# Patient Record
Sex: FEMALE | Race: WHITE
[De-identification: names, ages, dates, MRNs, and addresses within clinical notes are randomized per-mention and may not be internally consistent; named-entity substitution may affect disease eponyms.]

---

## 2018-06-22 ENCOUNTER — HOSPITAL ENCOUNTER (OUTPATIENT)
Dept: HOSPITAL 47 - RADMAMWWP | Age: 48
Discharge: HOME | End: 2018-06-22
Payer: COMMERCIAL

## 2018-06-22 DIAGNOSIS — R92.8: ICD-10-CM

## 2018-06-22 DIAGNOSIS — Z12.31: Primary | ICD-10-CM

## 2018-06-22 PROCEDURE — 77067 SCR MAMMO BI INCL CAD: CPT

## 2018-06-22 PROCEDURE — 77066 DX MAMMO INCL CAD BI: CPT

## 2018-06-22 PROCEDURE — 77062 BREAST TOMOSYNTHESIS BI: CPT

## 2018-06-22 NOTE — MM
Reason for exam: additional evaluation requested from abnormal screening.



History:

Family history of breast cancer in paternal grandmother and breast cancer in 

paternal aunt.

Took hormonal contraceptives for 5 years.



Physical Findings:

Breast exam preformed at baseline screening.



MG 3D Work Up W/Cad MAYRA

Bilateral LM view(s) were taken.  Spot compression CC and spot compression MLO 

view(s) were taken of the left breast.

Finding: There is a 6 x 8 mm high density, circumscribed round mass located 5 cm 

from the nipple in the lower quadrant, middle position.



These results were verbally communicated with the patient and result sheet given 

to the patient on 6/22/18.





ASSESSMENT: Incomplete: need additional imaging evaluation, BI-RAD 0



RECOMMENDATION:

Ultrasound of the left breast.

## 2018-06-22 NOTE — MM
Reason for exam: screening  (asymptomatic).

Baseline mammogram.



History:

Family history of breast cancer in paternal grandmother and breast cancer in 

paternal aunt.

Took hormonal contraceptives for 5 years.



Physical Findings:

Nurse Summary: bilateral prominent nodularity (nurse ts).



MG Screening Mammo w CAD

Bilateral CC and MLO view(s) were taken.

The breast tissue is heterogeneously dense. This may lower the sensitivity of 

mammography.

Finding: There is a 8 mm equal density (isodense), indistinct round mass located 5

cm from the nipple in the middle position of the left breast on CC view.



These results were verbally communicated with the patient and result sheet given 

to the patient on 6/22/18.





ASSESSMENT: Incomplete: need additional imaging evaluation, BI-RAD 0



RECOMMENDATION:

Special view mammogram of both breasts.



If lesion persists on supplemental views, image directed ultrasound is 

recommended.



[facility] will attempt to contact patient to return for supplemental 

views and ultrasound if indicated.

## 2018-06-22 NOTE — USB
Reason for exam: additional evaluation requested from abnormal screening.



History:

Family history of breast cancer in paternal grandmother and breast cancer in 

paternal aunt.

Took hormonal contraceptives for 5 years.



US Breast Workup Limited LT

Left limited breast ultrasound including focal area of concern, retroareolar and 

axilla demonstrates a 0.4 x 0.6 x 0.6cm ductal lesion at 4 o'clock, a 0.3 x 0.3 x 

0.4cm lesion too small to characterize at 6 o'clock, a 0.5cm node at the axilla 

and a 0.4 x 0.3 x 0.3cm lesion too small to characterize at 5 o'clock. Real time 

observation and scanning, no spiculated mass or shadowing.



These results were verbally communicated with the patient and result sheet given 

to the patient on 6/22/18.





ASSESSMENT: Probably benign, BI-RAD 3



RECOMMENDATION:

Follow-up diagnostic mammogram of the left breast in 3 months.

## 2021-11-01 ENCOUNTER — HOSPITAL ENCOUNTER (EMERGENCY)
Dept: HOSPITAL 47 - EC | Age: 51
Discharge: HOME | End: 2021-11-01
Payer: COMMERCIAL

## 2021-11-01 VITALS
TEMPERATURE: 98.4 F | RESPIRATION RATE: 16 BRPM | HEART RATE: 71 BPM | SYSTOLIC BLOOD PRESSURE: 130 MMHG | DIASTOLIC BLOOD PRESSURE: 83 MMHG

## 2021-11-01 DIAGNOSIS — Z85.820: ICD-10-CM

## 2021-11-01 DIAGNOSIS — Z20.822: ICD-10-CM

## 2021-11-01 DIAGNOSIS — Z90.710: ICD-10-CM

## 2021-11-01 DIAGNOSIS — R50.9: Primary | ICD-10-CM

## 2021-11-01 LAB
ALBUMIN SERPL-MCNC: 4.2 G/DL (ref 3.5–5)
ALP SERPL-CCNC: 77 U/L (ref 38–126)
ALT SERPL-CCNC: 16 U/L (ref 4–34)
ANION GAP SERPL CALC-SCNC: 7 MMOL/L
APTT BLD: 21.7 SEC (ref 22–30)
AST SERPL-CCNC: 19 U/L (ref 14–36)
BASOPHILS # BLD AUTO: 0 K/UL (ref 0–0.2)
BASOPHILS NFR BLD AUTO: 1 %
BUN SERPL-SCNC: 13 MG/DL (ref 7–17)
CALCIUM SPEC-MCNC: 9.5 MG/DL (ref 8.4–10.2)
CHLORIDE SERPL-SCNC: 104 MMOL/L (ref 98–107)
CO2 SERPL-SCNC: 26 MMOL/L (ref 22–30)
EOSINOPHIL # BLD AUTO: 0.3 K/UL (ref 0–0.7)
EOSINOPHIL NFR BLD AUTO: 4 %
ERYTHROCYTE [DISTWIDTH] IN BLOOD BY AUTOMATED COUNT: 3.97 M/UL (ref 3.8–5.4)
ERYTHROCYTE [DISTWIDTH] IN BLOOD: 12.6 % (ref 11.5–15.5)
GLUCOSE SERPL-MCNC: 103 MG/DL (ref 74–99)
HCT VFR BLD AUTO: 37.2 % (ref 34–46)
HGB BLD-MCNC: 13 GM/DL (ref 11.4–16)
INR PPP: 0.9 (ref ?–1.2)
LYMPHOCYTES # SPEC AUTO: 1.5 K/UL (ref 1–4.8)
LYMPHOCYTES NFR SPEC AUTO: 20 %
MAGNESIUM SPEC-SCNC: 1.9 MG/DL (ref 1.6–2.3)
MCH RBC QN AUTO: 32.7 PG (ref 25–35)
MCHC RBC AUTO-ENTMCNC: 34.9 G/DL (ref 31–37)
MCV RBC AUTO: 93.6 FL (ref 80–100)
MONOCYTES # BLD AUTO: 0.3 K/UL (ref 0–1)
MONOCYTES NFR BLD AUTO: 4 %
NEUTROPHILS # BLD AUTO: 5.3 K/UL (ref 1.3–7.7)
NEUTROPHILS NFR BLD AUTO: 70 %
PLATELET # BLD AUTO: 322 K/UL (ref 150–450)
POTASSIUM SERPL-SCNC: 4.1 MMOL/L (ref 3.5–5.1)
PROT SERPL-MCNC: 7.1 G/DL (ref 6.3–8.2)
PT BLD: 9.9 SEC (ref 9–12)
SODIUM SERPL-SCNC: 137 MMOL/L (ref 137–145)
WBC # BLD AUTO: 7.5 K/UL (ref 3.8–10.6)

## 2021-11-01 PROCEDURE — 84484 ASSAY OF TROPONIN QUANT: CPT

## 2021-11-01 PROCEDURE — 80053 COMPREHEN METABOLIC PANEL: CPT

## 2021-11-01 PROCEDURE — 85610 PROTHROMBIN TIME: CPT

## 2021-11-01 PROCEDURE — 85730 THROMBOPLASTIN TIME PARTIAL: CPT

## 2021-11-01 PROCEDURE — 83735 ASSAY OF MAGNESIUM: CPT

## 2021-11-01 PROCEDURE — 85025 COMPLETE CBC W/AUTO DIFF WBC: CPT

## 2021-11-01 PROCEDURE — 93005 ELECTROCARDIOGRAM TRACING: CPT

## 2021-11-01 PROCEDURE — 71046 X-RAY EXAM CHEST 2 VIEWS: CPT

## 2021-11-01 PROCEDURE — 36415 COLL VENOUS BLD VENIPUNCTURE: CPT

## 2021-11-01 PROCEDURE — 85379 FIBRIN DEGRADATION QUANT: CPT

## 2021-11-01 PROCEDURE — 99285 EMERGENCY DEPT VISIT HI MDM: CPT

## 2021-11-01 PROCEDURE — 87635 SARS-COV-2 COVID-19 AMP PRB: CPT

## 2021-11-01 NOTE — XR
EXAMINATION TYPE: XR chest 2V

 

DATE OF EXAM: 11/1/2021

 

COMPARISON: NONE

 

HISTORY: COVID exposure.

 

TECHNIQUE:  Frontal and lateral views of the chest are obtained.

 

FINDINGS:  There is no focal air space opacity, pleural effusion, or pneumothorax seen.  The cardiac 
silhouette size is within mildly enlarged. Multilevel osteophytes in the mid to lower thoracic spine.
 Overlying bra strap.

 

IMPRESSION:  No suspicious acute pulmonary process.

## 2021-11-01 NOTE — ED
General Adult HPI





- General


Chief complaint: Upper Respiratory Infection


Stated complaint: Fever,Cough,SOB


Time Seen by Provider: 21 15:00


Source: patient, RN notes reviewed, old records reviewed


Mode of arrival: ambulatory


Limitations: no limitations





- History of Present Illness


Initial comments: 





This is a well-appearing 51-year-old female alert and oriented 4, presents to 

the emergency room with her sister after being exposed to Covid.  Patient states

that her daughter has Covid. She states her symptoms developed last night with 

chest pressure, runny nose, sore throat.  She does have a history of melanoma 

several years ago.  Denies any other medical history.  Denies any nausea 

vomiting.  She states that she has had some diarrhea.  She states that she did 

not actually check her temperature with a thermometer but just felt chilled.  

She did take Tylenol this morning around 10:00.


-: days(s) (1)


Location: head, chest


Radiation: non-radiation


Severity scale (1-10): 6


Quality: other (pressure)


Improves with: none


Worsens with: none


Associated Symptoms: chest pain, fever/chills, headaches, other (congestion)


Treatments Prior to Arrival: other (tylenol)





- Related Data


                                    Allergies











Allergy/AdvReac Type Severity Reaction Status Date / Time


 


ondansetron [From Zofran] Allergy  Nausea & Verified 21 14:47





   Vomiting &  





   Diarrhea  














Review of Systems


ROS Statement: 


Those systems with pertinent positive or pertinent negative responses have been 

documented in the HPI.





ROS Other: All systems not noted in ROS Statement are negative.





Past Medical History


Past Medical History: Cancer


History of Any Multi-Drug Resistant Organisms: None Reported


Past Surgical History:  Section, Hysterectomy


Additional Past Surgical History / Comment(s): melanomia


Past Psychological History: No Psychological Hx Reported


Smoking Status: Never smoker


Past Alcohol Use History: Occasional


Past Drug Use History: None Reported





General Exam


Limitations: no limitations


General appearance: alert, in no apparent distress


Head exam: Present: atraumatic, normocephalic, normal inspection


Eye exam: Present: normal appearance, EOMI.  Absent: scleral icterus, 

conjunctival injection


ENT exam: Present: normal exam, normal oropharynx, mucous membranes moist, TM's 

normal bilaterally, other (Nasal turbinates are red)


Neck exam: Present: normal inspection, tenderness (along cervical chain), full 

ROM.  Absent: meningismus, lymphadenopathy, thyromegaly


Respiratory exam: Present: normal lung sounds bilaterally.  Absent: respiratory 

distress, wheezes, rales, rhonchi, stridor, chest wall tenderness, accessory 

muscle use, decreased breath sounds


Cardiovascular Exam: Present: regular rate, normal rhythm, normal heart sounds. 

Absent: systolic murmur, diastolic murmur, rubs, gallop, clicks


GI/Abdominal exam: Present: soft, normal bowel sounds.  Absent: distended, 

tenderness, guarding, rebound, rigid


Extremities exam: Present: normal inspection, full ROM, normal capillary refill.

 Absent: tenderness, pedal edema, joint swelling, calf tenderness


Back exam: Present: normal inspection, full ROM.  Absent: tenderness, CVA 

tenderness (R), CVA tenderness (L), rash noted


Neurological exam: Present: alert, oriented X3, CN II-XII intact


Psychiatric exam: Present: normal affect, normal mood


Skin exam: Present: warm, dry, intact, normal color.  Absent: rash, cyanosis, 

diaphoretic





Course


                                   Vital Signs











  21





  14:42 16:52


 


Temperature 100.1 F H 98.4 F


 


Pulse Rate 81 71


 


Respiratory 18 16





Rate  


 


Blood Pressure 141/80 130/83


 


O2 Sat by Pulse 98 97





Oximetry  














EKG Findings





- EKG Results:


EKG: sinus rhythm (Ventricular rate 70, SD interval 0.144, QRS 0.90, QTC 0.440)





Medical Decision Making





- Medical Decision Making





EKG shows a sinus rhythm, troponin is negative at 0.012.  There is no evidence 

of leukocytosis.  Hemoglobin and hematocrit are stable.  Coronavirus PCR is 

negative.  Chest x-ray shows no suspicious acute pulmonary process.  Patient is 

in no respiratory distress and her vital signs are stable.  Case discussed with 

Dr. Arnold.  Patient will be discharged home to follow up with her primary care 

doctor Tylenol and Motrin as needed for pain.  Return to the emergency room with

new or worsening symptoms.





- Lab Data


Result diagrams: 


                                 21 15:57





                                 21 15:57


                                   Lab Results











  21 Range/Units





  14:49 15:57 15:57 


 


WBC   7.5   (3.8-10.6)  k/uL


 


RBC   3.97   (3.80-5.40)  m/uL


 


Hgb   13.0   (11.4-16.0)  gm/dL


 


Hct   37.2   (34.0-46.0)  %


 


MCV   93.6   (80.0-100.0)  fL


 


MCH   32.7   (25.0-35.0)  pg


 


MCHC   34.9   (31.0-37.0)  g/dL


 


RDW   12.6   (11.5-15.5)  %


 


Plt Count   322   (150-450)  k/uL


 


MPV   7.0   


 


Neutrophils %   70   %


 


Lymphocytes %   20   %


 


Monocytes %   4   %


 


Eosinophils %   4   %


 


Basophils %   1   %


 


Neutrophils #   5.3   (1.3-7.7)  k/uL


 


Lymphocytes #   1.5   (1.0-4.8)  k/uL


 


Monocytes #   0.3   (0-1.0)  k/uL


 


Eosinophils #   0.3   (0-0.7)  k/uL


 


Basophils #   0.0   (0-0.2)  k/uL


 


PT    9.9  (9.0-12.0)  sec


 


INR    0.9  (<1.2)  


 


APTT    21.7 L  (22.0-30.0)  sec


 


D-Dimer    0.47  (<0.60)  mg/L FEU


 


Sodium     (137-145)  mmol/L


 


Potassium     (3.5-5.1)  mmol/L


 


Chloride     ()  mmol/L


 


Carbon Dioxide     (22-30)  mmol/L


 


Anion Gap     mmol/L


 


BUN     (7-17)  mg/dL


 


Creatinine     (0.52-1.04)  mg/dL


 


Est GFR (CKD-EPI)AfAm     (>60 ml/min/1.73 sqM)  


 


Est GFR (CKD-EPI)NonAf     (>60 ml/min/1.73 sqM)  


 


Glucose     (74-99)  mg/dL


 


Calcium     (8.4-10.2)  mg/dL


 


Magnesium     (1.6-2.3)  mg/dL


 


Total Bilirubin     (0.2-1.3)  mg/dL


 


AST     (14-36)  U/L


 


ALT     (4-34)  U/L


 


Alkaline Phosphatase     ()  U/L


 


Troponin I     (0.000-0.034)  ng/mL


 


Total Protein     (6.3-8.2)  g/dL


 


Albumin     (3.5-5.0)  g/dL


 


Coronavirus (PCR)  Not Detected    (Not Detectd)  














  21 Range/Units





  15:57 15:57 


 


WBC    (3.8-10.6)  k/uL


 


RBC    (3.80-5.40)  m/uL


 


Hgb    (11.4-16.0)  gm/dL


 


Hct    (34.0-46.0)  %


 


MCV    (80.0-100.0)  fL


 


MCH    (25.0-35.0)  pg


 


MCHC    (31.0-37.0)  g/dL


 


RDW    (11.5-15.5)  %


 


Plt Count    (150-450)  k/uL


 


MPV    


 


Neutrophils %    %


 


Lymphocytes %    %


 


Monocytes %    %


 


Eosinophils %    %


 


Basophils %    %


 


Neutrophils #    (1.3-7.7)  k/uL


 


Lymphocytes #    (1.0-4.8)  k/uL


 


Monocytes #    (0-1.0)  k/uL


 


Eosinophils #    (0-0.7)  k/uL


 


Basophils #    (0-0.2)  k/uL


 


PT    (9.0-12.0)  sec


 


INR    (<1.2)  


 


APTT    (22.0-30.0)  sec


 


D-Dimer    (<0.60)  mg/L FEU


 


Sodium  137   (137-145)  mmol/L


 


Potassium  4.1   (3.5-5.1)  mmol/L


 


Chloride  104   ()  mmol/L


 


Carbon Dioxide  26   (22-30)  mmol/L


 


Anion Gap  7   mmol/L


 


BUN  13   (7-17)  mg/dL


 


Creatinine  0.63   (0.52-1.04)  mg/dL


 


Est GFR (CKD-EPI)AfAm  >90   (>60 ml/min/1.73 sqM)  


 


Est GFR (CKD-EPI)NonAf  >90   (>60 ml/min/1.73 sqM)  


 


Glucose  103 H   (74-99)  mg/dL


 


Calcium  9.5   (8.4-10.2)  mg/dL


 


Magnesium  1.9   (1.6-2.3)  mg/dL


 


Total Bilirubin  0.5   (0.2-1.3)  mg/dL


 


AST  19   (14-36)  U/L


 


ALT  16   (4-34)  U/L


 


Alkaline Phosphatase  77   ()  U/L


 


Troponin I   <0.012  (0.000-0.034)  ng/mL


 


Total Protein  7.1   (6.3-8.2)  g/dL


 


Albumin  4.2   (3.5-5.0)  g/dL


 


Coronavirus (PCR)    (Not Detectd)  














Disposition


Clinical Impression: 


 Fever





Disposition: HOME SELF-CARE


Condition: Good


Instructions (If sedation given, give patient instructions):  Upper Respiratory 

Infection (ED)


Additional Instructions: 





Your EKG and troponin levels are normal today.  Chest x-ray does not show any 

concern for pneumonia.  There is no abnormalities in your blood work today.  

Coronavirus swab is negative.  Take Tylenol and/or Motrin as needed for body 

aches and fever.  Follow-up with your primary care doctor in 1 week.  Return to 

the emergency room with any new or worsening symptoms.





Is patient prescribed a controlled substance at d/c from ED?: No


Referrals: 


None,Stated [Primary Care Provider] - 1-2 days


Time of Disposition: 17:15